# Patient Record
Sex: MALE
[De-identification: names, ages, dates, MRNs, and addresses within clinical notes are randomized per-mention and may not be internally consistent; named-entity substitution may affect disease eponyms.]

---

## 2019-12-27 PROBLEM — Z00.00 ENCOUNTER FOR PREVENTIVE HEALTH EXAMINATION: Status: ACTIVE | Noted: 2019-12-27

## 2020-11-04 ENCOUNTER — TRANSCRIPTION ENCOUNTER (OUTPATIENT)
Age: 65
End: 2020-11-04

## 2021-01-29 ENCOUNTER — TRANSCRIPTION ENCOUNTER (OUTPATIENT)
Age: 66
End: 2021-01-29

## 2022-03-28 ENCOUNTER — TRANSCRIPTION ENCOUNTER (OUTPATIENT)
Age: 67
End: 2022-03-28

## 2023-03-15 ENCOUNTER — NON-APPOINTMENT (OUTPATIENT)
Age: 68
End: 2023-03-15

## 2023-03-30 ENCOUNTER — NON-APPOINTMENT (OUTPATIENT)
Age: 68
End: 2023-03-30

## 2024-04-22 ENCOUNTER — APPOINTMENT (EMERGENCY)
Dept: CT IMAGING | Facility: HOSPITAL | Age: 69
End: 2024-04-22
Payer: COMMERCIAL

## 2024-04-22 ENCOUNTER — HOSPITAL ENCOUNTER (EMERGENCY)
Facility: HOSPITAL | Age: 69
Discharge: HOME | End: 2024-04-23
Attending: EMERGENCY MEDICINE
Payer: COMMERCIAL

## 2024-04-22 DIAGNOSIS — F19.90 SUBSTANCE USE: Primary | ICD-10-CM

## 2024-04-22 DIAGNOSIS — R40.4 ALTERED AWARENESS, TRANSIENT: ICD-10-CM

## 2024-04-22 DIAGNOSIS — R53.1 GENERALIZED WEAKNESS: ICD-10-CM

## 2024-04-22 DIAGNOSIS — E86.0 DEHYDRATION: ICD-10-CM

## 2024-04-22 LAB
ALBUMIN SERPL BCP-MCNC: 3.9 G/DL (ref 3.5–5)
ALP SERPL-CCNC: 47 U/L (ref 34–104)
ALT SERPL W P-5'-P-CCNC: 25 U/L (ref 7–52)
ANION GAP SERPL CALCULATED.3IONS-SCNC: 9 MMOL/L (ref 4–13)
APAP SERPL-MCNC: <2 UG/ML (ref 10–20)
APTT PPP: 29 SECONDS (ref 23–37)
AST SERPL W P-5'-P-CCNC: 26 U/L (ref 13–39)
BASE EX.OXY STD BLDV CALC-SCNC: 83.7 % (ref 60–80)
BASE EXCESS BLDV CALC-SCNC: -2.6 MMOL/L
BASOPHILS # BLD AUTO: 0.04 THOUSANDS/ÂΜL (ref 0–0.1)
BASOPHILS NFR BLD AUTO: 0 % (ref 0–1)
BILIRUB SERPL-MCNC: 0.93 MG/DL (ref 0.2–1)
BUN SERPL-MCNC: 21 MG/DL (ref 5–25)
CALCIUM SERPL-MCNC: 8.7 MG/DL (ref 8.4–10.2)
CARDIAC TROPONIN I PNL SERPL HS: 3 NG/L
CHLORIDE SERPL-SCNC: 103 MMOL/L (ref 96–108)
CK SERPL-CCNC: 127 U/L (ref 39–308)
CO2 SERPL-SCNC: 24 MMOL/L (ref 21–32)
CREAT SERPL-MCNC: 1.23 MG/DL (ref 0.6–1.3)
EOSINOPHIL # BLD AUTO: 0.26 THOUSAND/ÂΜL (ref 0–0.61)
EOSINOPHIL NFR BLD AUTO: 2 % (ref 0–6)
ERYTHROCYTE [DISTWIDTH] IN BLOOD BY AUTOMATED COUNT: 11.9 % (ref 11.6–15.1)
ETHANOL SERPL-MCNC: <10 MG/DL
GFR SERPL CREATININE-BSD FRML MDRD: 59 ML/MIN/1.73SQ M
GLUCOSE SERPL-MCNC: 218 MG/DL (ref 65–140)
GLUCOSE SERPL-MCNC: 244 MG/DL (ref 65–140)
HCO3 BLDV-SCNC: 22.2 MMOL/L (ref 24–30)
HCT VFR BLD AUTO: 42.3 % (ref 36.5–49.3)
HGB BLD-MCNC: 14.4 G/DL (ref 12–17)
IMM GRANULOCYTES # BLD AUTO: 0.05 THOUSAND/UL (ref 0–0.2)
IMM GRANULOCYTES NFR BLD AUTO: 0 % (ref 0–2)
INR PPP: 1.11 (ref 0.84–1.19)
LACTATE SERPL-SCNC: 1.6 MMOL/L (ref 0.5–2)
LYMPHOCYTES # BLD AUTO: 3.43 THOUSANDS/ÂΜL (ref 0.6–4.47)
LYMPHOCYTES NFR BLD AUTO: 30 % (ref 14–44)
MCH RBC QN AUTO: 32.5 PG (ref 26.8–34.3)
MCHC RBC AUTO-ENTMCNC: 34 G/DL (ref 31.4–37.4)
MCV RBC AUTO: 96 FL (ref 82–98)
MONOCYTES # BLD AUTO: 1.1 THOUSAND/ÂΜL (ref 0.17–1.22)
MONOCYTES NFR BLD AUTO: 10 % (ref 4–12)
NEUTROPHILS # BLD AUTO: 6.61 THOUSANDS/ÂΜL (ref 1.85–7.62)
NEUTS SEG NFR BLD AUTO: 58 % (ref 43–75)
NRBC BLD AUTO-RTO: 0 /100 WBCS
O2 CT BLDV-SCNC: 17.7 ML/DL
PCO2 BLDV: 38.7 MM HG (ref 42–50)
PH BLDV: 7.38 [PH] (ref 7.3–7.4)
PLATELET # BLD AUTO: 327 THOUSANDS/UL (ref 149–390)
PMV BLD AUTO: 9.9 FL (ref 8.9–12.7)
PO2 BLDV: 50.7 MM HG (ref 35–45)
POTASSIUM SERPL-SCNC: 3.7 MMOL/L (ref 3.5–5.3)
PROT SERPL-MCNC: 6.2 G/DL (ref 6.4–8.4)
PROTHROMBIN TIME: 15 SECONDS (ref 11.6–14.5)
RBC # BLD AUTO: 4.43 MILLION/UL (ref 3.88–5.62)
SALICYLATES SERPL-MCNC: <5 MG/DL (ref 3–20)
SODIUM SERPL-SCNC: 136 MMOL/L (ref 135–147)
WBC # BLD AUTO: 11.49 THOUSAND/UL (ref 4.31–10.16)

## 2024-04-22 PROCEDURE — 96360 HYDRATION IV INFUSION INIT: CPT

## 2024-04-22 PROCEDURE — 82805 BLOOD GASES W/O2 SATURATION: CPT | Performed by: EMERGENCY MEDICINE

## 2024-04-22 PROCEDURE — 85610 PROTHROMBIN TIME: CPT | Performed by: EMERGENCY MEDICINE

## 2024-04-22 PROCEDURE — 85730 THROMBOPLASTIN TIME PARTIAL: CPT | Performed by: EMERGENCY MEDICINE

## 2024-04-22 PROCEDURE — 82077 ASSAY SPEC XCP UR&BREATH IA: CPT | Performed by: EMERGENCY MEDICINE

## 2024-04-22 PROCEDURE — 99285 EMERGENCY DEPT VISIT HI MDM: CPT | Performed by: EMERGENCY MEDICINE

## 2024-04-22 PROCEDURE — 82948 REAGENT STRIP/BLOOD GLUCOSE: CPT

## 2024-04-22 PROCEDURE — 85025 COMPLETE CBC W/AUTO DIFF WBC: CPT | Performed by: EMERGENCY MEDICINE

## 2024-04-22 PROCEDURE — 80053 COMPREHEN METABOLIC PANEL: CPT | Performed by: EMERGENCY MEDICINE

## 2024-04-22 PROCEDURE — 36415 COLL VENOUS BLD VENIPUNCTURE: CPT | Performed by: EMERGENCY MEDICINE

## 2024-04-22 PROCEDURE — 93005 ELECTROCARDIOGRAM TRACING: CPT

## 2024-04-22 PROCEDURE — 82550 ASSAY OF CK (CPK): CPT | Performed by: EMERGENCY MEDICINE

## 2024-04-22 PROCEDURE — 70450 CT HEAD/BRAIN W/O DYE: CPT

## 2024-04-22 PROCEDURE — 84484 ASSAY OF TROPONIN QUANT: CPT | Performed by: EMERGENCY MEDICINE

## 2024-04-22 PROCEDURE — 83605 ASSAY OF LACTIC ACID: CPT | Performed by: EMERGENCY MEDICINE

## 2024-04-22 PROCEDURE — 99285 EMERGENCY DEPT VISIT HI MDM: CPT

## 2024-04-22 PROCEDURE — 80143 DRUG ASSAY ACETAMINOPHEN: CPT | Performed by: EMERGENCY MEDICINE

## 2024-04-22 PROCEDURE — 96361 HYDRATE IV INFUSION ADD-ON: CPT

## 2024-04-22 PROCEDURE — 80179 DRUG ASSAY SALICYLATE: CPT | Performed by: EMERGENCY MEDICINE

## 2024-04-22 RX ADMIN — SODIUM CHLORIDE 1000 ML: 0.9 INJECTION, SOLUTION INTRAVENOUS at 22:54

## 2024-04-23 VITALS
DIASTOLIC BLOOD PRESSURE: 69 MMHG | TEMPERATURE: 97.4 F | WEIGHT: 227.96 LBS | OXYGEN SATURATION: 98 % | BODY MASS INDEX: 30.88 KG/M2 | HEART RATE: 91 BPM | HEIGHT: 72 IN | SYSTOLIC BLOOD PRESSURE: 107 MMHG | RESPIRATION RATE: 13 BRPM

## 2024-04-23 LAB
2HR DELTA HS TROPONIN: 4 NG/L
ANION GAP SERPL CALCULATED.3IONS-SCNC: 5 MMOL/L (ref 4–13)
ATRIAL RATE: 89 BPM
B-OH-BUTYR SERPL-MCNC: 0.24 MMOL/L (ref 0.02–0.27)
BACTERIA UR QL AUTO: ABNORMAL /HPF
BASE EX.OXY STD BLDV CALC-SCNC: 79.5 % (ref 60–80)
BASE EXCESS BLDV CALC-SCNC: -3.4 MMOL/L
BILIRUB UR QL STRIP: NEGATIVE
BUN SERPL-MCNC: 18 MG/DL (ref 5–25)
CALCIUM SERPL-MCNC: 8.4 MG/DL (ref 8.4–10.2)
CARDIAC TROPONIN I PNL SERPL HS: 7 NG/L
CHLORIDE SERPL-SCNC: 105 MMOL/L (ref 96–108)
CLARITY UR: CLEAR
CO2 SERPL-SCNC: 25 MMOL/L (ref 21–32)
COLOR UR: ABNORMAL
CREAT SERPL-MCNC: 1.09 MG/DL (ref 0.6–1.3)
GFR SERPL CREATININE-BSD FRML MDRD: 69 ML/MIN/1.73SQ M
GLUCOSE SERPL-MCNC: 233 MG/DL (ref 65–140)
GLUCOSE SERPL-MCNC: 235 MG/DL (ref 65–140)
GLUCOSE SERPL-MCNC: 245 MG/DL (ref 65–140)
GLUCOSE SERPL-MCNC: 250 MG/DL (ref 65–140)
GLUCOSE UR STRIP-MCNC: ABNORMAL MG/DL
HCO3 BLDV-SCNC: 22.1 MMOL/L (ref 24–30)
HGB UR QL STRIP.AUTO: NEGATIVE
KETONES UR STRIP-MCNC: ABNORMAL MG/DL
LEUKOCYTE ESTERASE UR QL STRIP: NEGATIVE
MUCOUS THREADS UR QL AUTO: ABNORMAL
NITRITE UR QL STRIP: NEGATIVE
NON-SQ EPI CELLS URNS QL MICRO: ABNORMAL /HPF
O2 CT BLDV-SCNC: 15.5 ML/DL
P AXIS: 60 DEGREES
PCO2 BLDV: 41.7 MM HG (ref 42–50)
PH BLDV: 7.34 [PH] (ref 7.3–7.4)
PH UR STRIP.AUTO: 5.5 [PH]
PO2 BLDV: 44.9 MM HG (ref 35–45)
POTASSIUM SERPL-SCNC: 4.2 MMOL/L (ref 3.5–5.3)
PR INTERVAL: 240 MS
PROT UR STRIP-MCNC: NEGATIVE MG/DL
QRS AXIS: 58 DEGREES
QRSD INTERVAL: 90 MS
QT INTERVAL: 382 MS
QTC INTERVAL: 464 MS
RBC #/AREA URNS AUTO: ABNORMAL /HPF
SODIUM SERPL-SCNC: 135 MMOL/L (ref 135–147)
SP GR UR STRIP.AUTO: 1.03 (ref 1–1.03)
T WAVE AXIS: 9 DEGREES
UROBILINOGEN UR STRIP-ACNC: <2 MG/DL
VENTRICULAR RATE: 89 BPM
WBC #/AREA URNS AUTO: ABNORMAL /HPF

## 2024-04-23 PROCEDURE — 81001 URINALYSIS AUTO W/SCOPE: CPT | Performed by: EMERGENCY MEDICINE

## 2024-04-23 PROCEDURE — 82010 KETONE BODYS QUAN: CPT | Performed by: EMERGENCY MEDICINE

## 2024-04-23 PROCEDURE — 80048 BASIC METABOLIC PNL TOTAL CA: CPT | Performed by: EMERGENCY MEDICINE

## 2024-04-23 PROCEDURE — 93010 ELECTROCARDIOGRAM REPORT: CPT | Performed by: INTERNAL MEDICINE

## 2024-04-23 PROCEDURE — 84484 ASSAY OF TROPONIN QUANT: CPT | Performed by: EMERGENCY MEDICINE

## 2024-04-23 PROCEDURE — 82805 BLOOD GASES W/O2 SATURATION: CPT | Performed by: EMERGENCY MEDICINE

## 2024-04-23 PROCEDURE — 96361 HYDRATE IV INFUSION ADD-ON: CPT

## 2024-04-23 PROCEDURE — 82948 REAGENT STRIP/BLOOD GLUCOSE: CPT

## 2024-04-23 PROCEDURE — 36415 COLL VENOUS BLD VENIPUNCTURE: CPT | Performed by: EMERGENCY MEDICINE

## 2024-04-23 RX ADMIN — SODIUM CHLORIDE 1000 ML: 0.9 INJECTION, SOLUTION INTRAVENOUS at 02:25

## 2024-04-23 NOTE — ED PROVIDER NOTES
History  Chief Complaint   Patient presents with    Weakness - Generalized     Per family patient reports generalized weakness x 1.5 hours. Patient was unable to transfer per family. Patient had an edible about 3 hours ago. Patient Recent dx of DM2 @ (2 weeks ago) 0 insulin in the last 48 hours. Patient is Pale, responsive however weak and needed assistance with transfer from family.      69-year-old male presents with altered mental status per the patient's family.    Patient is awake and responsive, oriented to person, place, and time.    Patient's family states for approximately 1-1/2 hours he has been increasingly asthenic and more fatigued.  Patient's family was concerned that he had a low blood glucose as he was recently diagnosed with diabetes.  Patient's glucose had been decreasing so they had stopped his insulin over the past 48 hours.  They checked his blood glucose and noted it was in the 70s and gave him some orange juice with repeat glucose in the 200s.    Patient subsequently told his family that he had taken a THC edible shortly prior to the event.  Patient denies taking other illicit substances.  Patient denies taking this previously.    Patient and his family deny any falls or trauma.  Patient's family denies any symptoms prior to the onset of the symptoms.    Impression and plan: Altered mental status with generalized weakness and fatigue representing a broad differential.  More concern for toxicologic considering ingestion of THC containing product however limited history so we will obtain toxicologic and metabolic evaluation.  Patient is afebrile without history that would be consistent with infectious etiology per the family.  Considering limited history, will obtain CT imaging of patient's head to evaluate for intracranial etiology and place patient on cardiac monitoring.  Initial EKG obtained demonstrating sinus rhythm with first-degree AV block with occasional PACs.  Normal intervals  including a QTc of 464 and QRS of 90.        None       Past Medical History:   Diagnosis Date    Diabetes mellitus (HCC)        Past Surgical History:   Procedure Laterality Date    APPENDECTOMY         History reviewed. No pertinent family history.  I have reviewed and agree with the history as documented.    E-Cigarette/Vaping    E-Cigarette Use Never User      E-Cigarette/Vaping Substances     Social History     Tobacco Use    Smoking status: Never    Smokeless tobacco: Never   Vaping Use    Vaping status: Never Used   Substance Use Topics    Alcohol use: Not Currently     Comment: socially    Drug use: Yes     Types: Marijuana     Comment: edible       Review of Systems    Physical Exam  Physical Exam  Vitals reviewed.   HENT:      Head: Atraumatic.   Eyes:      Pupils: Pupils are equal, round, and reactive to light.      Comments: No nystagmus at rest or with fixation.   Cardiovascular:      Rate and Rhythm: Normal rate and regular rhythm.   Pulmonary:      Effort: Pulmonary effort is normal.   Abdominal:      General: There is no distension.      Tenderness: There is no abdominal tenderness. There is no guarding or rebound.   Musculoskeletal:      Cervical back: Neck supple.   Skin:     General: Skin is warm and dry.   Neurological:      Mental Status: He is alert.      Sensory: Sensation is intact.      Motor: Weakness present.      Comments: Alert, drowsy but responsive    Generalized weakness, no focal motor deficit         Vital Signs  ED Triage Vitals   Temperature Pulse Respirations Blood Pressure SpO2   04/22/24 2244 04/22/24 2240 04/22/24 2240 04/22/24 2244 04/22/24 2240   (!) 97.4 °F (36.3 °C) 91 20 98/64 95 %      Temp Source Heart Rate Source Patient Position - Orthostatic VS BP Location FiO2 (%)   04/22/24 2244 04/22/24 2240 04/22/24 2240 04/22/24 2245 --   Temporal Monitor Lying Right arm       Pain Score       04/22/24 2240       3           Vitals:    04/23/24 0300 04/23/24 0315 04/23/24 0345  04/23/24 0415   BP: 103/62 98/57 118/67 107/69   Pulse: 93 92 94 91   Patient Position - Orthostatic VS: Lying Lying Lying Lying         Visual Acuity      ED Medications  Medications   sodium chloride 0.9 % bolus 1,000 mL (0 mL Intravenous Stopped 4/23/24 0028)   sodium chloride 0.9 % bolus 1,000 mL (0 mL Intravenous Stopped 4/23/24 0336)       Diagnostic Studies  Results Reviewed       Procedure Component Value Units Date/Time    Fingerstick Glucose (POCT) [[id number]]  (Abnormal) Collected: 04/23/24 0408    Lab Status: Final result Specimen: Blood Updated: 04/23/24 0410     POC Glucose 250 mg/dl     Basic metabolic panel [[id number]]  (Abnormal) Collected: 04/23/24 0248    Lab Status: Final result Specimen: Blood from Arm, Left Updated: 04/23/24 0311     Sodium 135 mmol/L      Potassium 4.2 mmol/L      Chloride 105 mmol/L      CO2 25 mmol/L      ANION GAP 5 mmol/L      BUN 18 mg/dL      Creatinine 1.09 mg/dL      Glucose 235 mg/dL      Calcium 8.4 mg/dL      eGFR 69 ml/min/1.73sq m     Narrative:      National Kid[employer] Foundation guidelines for Chronic Kidney Disease (CKD):     Stage 1 with normal or high GFR (GFR > 90 mL/min/1.73 square meters)    Stage 2 Mild CKD (GFR = 60-89 mL/min/1.73 square meters)    Stage 3A Moderate CKD (GFR = 45-59 mL/min/1.73 square meters)    Stage 3B Moderate CKD (GFR = 30-44 mL/min/1.73 square meters)    Stage 4 Severe CKD (GFR = 15-29 mL/min/1.73 square meters)    Stage 5 End Stage CKD (GFR <15 mL/min/1.73 square meters)  Note: GFR calculation is accurate only with a steady state creatinine    Beta Hydroxybutyrate [[id number]]  (Normal) Collected: 04/23/24 0248    Lab Status: Final result Specimen: Blood from Arm, Left Updated: 04/23/24 0310     Beta- Hydroxybutyrate 0.24 mmol/L     Blood gas, venous [[id number]]  (Abnormal) Collected: 04/23/24 0248    Lab Status: Final result Specimen: Blood from Arm, Left Updated: 04/23/24 0255     pH, Johnny 7.343     pCO2, Johnny 41.7 mm Hg       pO2, Johnny 44.9 mm Hg      HCO3, Johnny 22.1 mmol/L      Base Excess, Johnny -3.4 mmol/L      O2 Content, Johnny 15.5 ml/dL      O2 HGB, VENOUS 79.5 %     Urine Microscopic [[id number]]  (Abnormal) Collected: 04/23/24 0232    Lab Status: Final result Specimen: Urine, Clean Catch Updated: 04/23/24 0244     RBC, UA 4-10 /hpf      WBC, UA 1-2 /hpf      Epithelial Cells None Seen /hpf      Bacteria, UA None Seen /hpf      MUCUS THREADS Occasional    UA (URINE) with reflex to Scope [[id number]]  (Abnormal) Collected: 04/23/24 0232    Lab Status: Final result Specimen: Urine, Clean Catch Updated: 04/23/24 0240     Color, UA Light Yellow     Clarity, UA Clear     Specific Gravity, UA 1.028     pH, UA 5.5     Leukocytes, UA Negative     Nitrite, UA Negative     Protein, UA Negative mg/dl      Glucose,  (1/2%) mg/dl      Ketones, UA 40 (2+) mg/dl      Urobilinogen, UA <2.0 mg/dl      Bilirubin, UA Negative     Occult Blood, UA Negative    Fingerstick Glucose (POCT) [[id number]]  (Abnormal) Collected: 04/23/24 0207    Lab Status: Final result Specimen: Blood Updated: 04/23/24 0208     POC Glucose 245 mg/dl     HS Troponin I 2hr [[id number]]  (Normal) Collected: 04/23/24 0053    Lab Status: Final result Specimen: Blood from Arm, Left Updated: 04/23/24 0125     hs TnI 2hr 7 ng/L      Delta 2hr hsTnI 4 ng/L     Fingerstick Glucose (POCT) [[id number]]  (Abnormal) Collected: 04/23/24 0051    Lab Status: Final result Specimen: Blood Updated: 04/23/24 0052     POC Glucose 233 mg/dl     Comprehensive metabolic panel [[id number]]  (Abnormal) Collected: 04/22/24 2254    Lab Status: Final result Specimen: Blood from Arm, Left Updated: 04/22/24 2331     Sodium 136 mmol/L      Potassium 3.7 mmol/L      Chloride 103 mmol/L      CO2 24 mmol/L      ANION GAP 9 mmol/L      BUN 21 mg/dL      Creatinine 1.23 mg/dL      Glucose 244 mg/dL      Calcium 8.7 mg/dL      AST 26 U/L      ALT 25 U/L      Alkaline Phosphatase 47 U/L      Total Protein 6.2 g/dL       Albumin 3.9 g/dL      Total Bilirubin 0.93 mg/dL      eGFR 59 ml/min/1.73sq m     Narrative:      National Kidney Disease Foundation guidelines for Chronic Kidney Disease (CKD):     Stage 1 with normal or high GFR (GFR > 90 mL/min/1.73 square meters)    Stage 2 Mild CKD (GFR = 60-89 mL/min/1.73 square meters)    Stage 3A Moderate CKD (GFR = 45-59 mL/min/1.73 square meters)    Stage 3B Moderate CKD (GFR = 30-44 mL/min/1.73 square meters)    Stage 4 Severe CKD (GFR = 15-29 mL/min/1.73 square meters)    Stage 5 End Stage CKD (GFR <15 mL/min/1.73 square meters)  Note: GFR calculation is accurate only with a steady state creatinine    CK [[id number]]  (Normal) Collected: 04/22/24 2254    Lab Status: Final result Specimen: Blood from Arm, Left Updated: 04/22/24 2331     Total  U/L     Salicylate level [[id number]]  (Normal) Collected: 04/22/24 2254    Lab Status: Final result Specimen: Blood from Arm, Left Updated: 04/22/24 2331     Salicylate Lvl <5 mg/dL     Acetaminophen level-If concentration is detectable, please discuss with medical  on call. [[id number]]  (Abnormal) Collected: 04/22/24 2254    Lab Status: Final result Specimen: Blood from Arm, Left Updated: 04/22/24 2331     Acetaminophen Level <2 ug/mL     HS Troponin 0hr (reflex protocol) [[id number]]  (Normal) Collected: 04/22/24 2254    Lab Status: Final result Specimen: Blood from Arm, Left Updated: 04/22/24 2327     hs TnI 0hr 3 ng/L     Ethanol [[id number]]  (Normal) Collected: 04/22/24 2254    Lab Status: Final result Specimen: Blood from Arm, Left Updated: 04/22/24 2320     Ethanol Lvl <10 mg/dL     Protime-INR [[id number]]  (Abnormal) Collected: 04/22/24 2254    Lab Status: Final result Specimen: Blood from Arm, Left Updated: 04/22/24 2320     Protime 15.0 seconds      INR 1.11    APTT [[id number]]  (Normal) Collected: 04/22/24 2254    Lab Status: Final result Specimen: Blood from Arm, Left Updated: 04/22/24 8940     PTT 29 seconds     Lactic  acid, plasma (w/reflex if result > 2.0) [[id number]]  (Normal) Collected: 04/22/24 2254    Lab Status: Final result Specimen: Blood from Arm, Left Updated: 04/22/24 2318     LACTIC ACID 1.6 mmol/L     Narrative:      Result may be elevated if tourniquet was used during collection.    Blood gas, venous [[id number]]  (Abnormal) Collected: 04/22/24 2254    Lab Status: Final result Specimen: Blood from Arm, Left Updated: 04/22/24 2308     pH, Johnny 7.377     pCO2, Johnny 38.7 mm Hg      pO2, Johnny 50.7 mm Hg      HCO3, Johnny 22.2 mmol/L      Base Excess, Johnny -2.6 mmol/L      O2 Content, Johnny 17.7 ml/dL      O2 HGB, VENOUS 83.7 %     CBC and differential [[id number]]  (Abnormal) Collected: 04/22/24 2254    Lab Status: Final result Specimen: Blood from Arm, Left Updated: 04/22/24 2306     WBC 11.49 Thousand/uL      RBC 4.43 Million/uL      Hemoglobin 14.4 g/dL      Hematocrit 42.3 %      MCV 96 fL      MCH 32.5 pg      MCHC 34.0 g/dL      RDW 11.9 %      MPV 9.9 fL      Platelets 327 Thousands/uL      nRBC 0 /100 WBCs      Segmented % 58 %      Immature Grans % 0 %      Lymphocytes % 30 %      Monocytes % 10 %      Eosinophils Relative 2 %      Basophils Relative 0 %      Absolute Neutrophils 6.61 Thousands/µL      Absolute Immature Grans 0.05 Thousand/uL      Absolute Lymphocytes 3.43 Thousands/µL      Absolute Monocytes 1.10 Thousand/µL      Eosinophils Absolute 0.26 Thousand/µL      Basophils Absolute 0.04 Thousands/µL     Fingerstick Glucose (POCT) [[id number]]  (Abnormal) Collected: 04/22/24 2241    Lab Status: Final result Specimen: Blood Updated: 04/22/24 2242     POC Glucose 218 mg/dl                    CT head without contrast   Final Result by Srinivasa Santa MD (04/22 2350)      No acute intracranial abnormality.                  Workstation performed: [id number]                    Procedures  Procedures         ED Course  ED Course as of 04/24/24 0429   Tue Apr 23, 2024 0034 Laboratory evaluation reassuring.  Patient  more alert but still persistently notes fatigue.  Patient states currently only notes a dry mouth, otherwise denies symptoms.  Patient was 85% on room air while sleeping and is supposed to use CPAP due to it history of sleep apnea.  Patient's wife states he rarely uses this and I discussed with the patient the importance and long-term risks associated with sleep apnea.  Will continue to monitor patient until back to baseline.   0451 Patient back to baseline.  Patient tolerating oral intake without difficulty.    Patient's pulse oximetry drops to mid to upper 80s while sleeping but is normal while awake.  Patient supposed to use CPAP for diagnosis of sleep apnea but reportedly refuses according to his wife.  I discussed with the patient the risks of this in detail and emphasized its use.    Patient's laboratory evaluation reassuring.  Urine with ketones but none on blood analysis with normal pH, no signs of DKA.  This is likely secondary to dehydration and patient is now tolerating oral intake without difficulty and has been resuscitated with fluids.    Discussed continued management and absolute avoidance of THC products.  Discussed close follow-up and return precautions in detail.               Identification of Seniors at Risk      Flowsheet Row Most Recent Value   (ISAR) Identification of Seniors at Risk    Before the illness or injury that brought you to the Emergency, did you need someone to help you on a regular basis? 0 Filed at: 04/22/2024 2243   In the last 24 hours, have you needed more help than usual? 1 Filed at: 04/22/2024 2243   Have you been hospitalized for one or more nights during the past 6 months? 0 Filed at: 04/22/2024 2243   In general, do you see well? 0 Filed at: 04/22/2024 2243   In general, do you have serious problems with your memory? 0 Filed at: 04/22/2024 2243   Do you take more than three different medications every day? 1 Filed at: 04/22/2024 2243   ISAR Score 2 Filed at: 04/22/2024  2243                                      Medical Decision Making  Amount and/or Complexity of Data Reviewed  Labs: ordered.  Radiology: ordered.             Disposition  Final diagnoses:   Substance use - THC   Generalized weakness   Dehydration   Altered awareness, transient     Time reflects when diagnosis was documented in both MDM as applicable and the Disposition within this note       Time User Action Codes Description Comment    4/23/2024 12:26 AM Leonel Enriquez Add [F19.90] Substance use     4/23/2024 12:26 AM Leonel Enriquez Modify [F19.90] Substance use THC    4/23/2024 12:26 AM Leonel Enriquez Add [R53.1] Generalized weakness     4/23/2024  4:53 AM Leonel Enriquez Add [E86.0] Dehydration     4/23/2024  4:53 AM Leonel Enriquez Add [R40.4] Altered awareness, transient           ED Disposition       ED Disposition   Discharge    Condition   Stable    Date/Time   Tue Apr 23, 2024 0026    Comment   Edu Reese discharge to home/self care.                   Follow-up Information       Follow up With Specialties Details Why Contact Info Additional Information    [facility] Emergency Medicine Go to  If symptoms worsen [address] [id number] [phone] [facility], [address], [place], [address]    Primary care  Schedule an appointment as soon as possible for a visit in 2 days Follow-up and reassessment              There are no discharge medications for this patient.      No discharge procedures on file.    PDMP Review       None            ED Provider  Electronically Signed by             Leonel Enriquez MD  04/24/24 0429

## 2024-04-23 NOTE — DISCHARGE INSTRUCTIONS
Do not use any more THC containing products.    Follow up with primary care with any persistent symptoms or return to the ER with any worsening symptoms.